# Patient Record
Sex: FEMALE | Race: WHITE
[De-identification: names, ages, dates, MRNs, and addresses within clinical notes are randomized per-mention and may not be internally consistent; named-entity substitution may affect disease eponyms.]

---

## 2017-06-11 ENCOUNTER — HOSPITAL ENCOUNTER (EMERGENCY)
Dept: HOSPITAL 74 - JER | Age: 47
Discharge: HOME | End: 2017-06-11
Payer: COMMERCIAL

## 2017-06-11 VITALS — SYSTOLIC BLOOD PRESSURE: 138 MMHG | DIASTOLIC BLOOD PRESSURE: 88 MMHG | HEART RATE: 88 BPM

## 2017-06-11 VITALS — BODY MASS INDEX: 31.9 KG/M2

## 2017-06-11 VITALS — TEMPERATURE: 98.4 F

## 2017-06-11 DIAGNOSIS — R10.13: Primary | ICD-10-CM

## 2017-06-11 DIAGNOSIS — R19.7: ICD-10-CM

## 2017-06-11 DIAGNOSIS — Z88.8: ICD-10-CM

## 2017-06-11 LAB
ALBUMIN SERPL-MCNC: 3.3 G/DL (ref 3.4–5)
ALP SERPL-CCNC: 64 U/L (ref 45–117)
ALT SERPL-CCNC: 17 U/L (ref 12–78)
ANION GAP SERPL CALC-SCNC: 10 MMOL/L (ref 8–16)
AST SERPL-CCNC: 15 U/L (ref 15–37)
BASOPHILS # BLD: 0.1 % (ref 0–2)
BILIRUB SERPL-MCNC: 0.4 MG/DL (ref 0.2–1)
CALCIUM SERPL-MCNC: 7.9 MG/DL (ref 8.5–10.1)
CO2 SERPL-SCNC: 23 MMOL/L (ref 21–32)
COCKROFT - GAULT: 179.92
CREAT SERPL-MCNC: 0.6 MG/DL (ref 0.55–1.02)
DEPRECATED RDW RBC AUTO: 16.1 % (ref 11.6–15.6)
EOSINOPHIL # BLD: 0.6 % (ref 0–4.5)
GLUCOSE SERPL-MCNC: 84 MG/DL (ref 74–106)
MCH RBC QN AUTO: 26.5 PG (ref 25.7–33.7)
MCHC RBC AUTO-ENTMCNC: 32.5 G/DL (ref 32–36)
MCV RBC: 81.6 FL (ref 80–96)
NEUTROPHILS # BLD: 78.3 % (ref 42.8–82.8)
PLATELET # BLD AUTO: 203 K/MM3 (ref 134–434)
PMV BLD: 7.6 FL (ref 7.5–11.1)
PROT SERPL-MCNC: 6.3 G/DL (ref 6.4–8.2)
WBC # BLD AUTO: 6.6 K/MM3 (ref 4–10)

## 2017-06-11 PROCEDURE — 3E033NZ INTRODUCTION OF ANALGESICS, HYPNOTICS, SEDATIVES INTO PERIPHERAL VEIN, PERCUTANEOUS APPROACH: ICD-10-PCS

## 2017-06-11 PROCEDURE — 3E0337Z INTRODUCTION OF ELECTROLYTIC AND WATER BALANCE SUBSTANCE INTO PERIPHERAL VEIN, PERCUTANEOUS APPROACH: ICD-10-PCS

## 2017-06-11 PROCEDURE — 3E033GC INTRODUCTION OF OTHER THERAPEUTIC SUBSTANCE INTO PERIPHERAL VEIN, PERCUTANEOUS APPROACH: ICD-10-PCS

## 2017-06-11 NOTE — PDOC
History of Present Illness





- General


Chief Complaint: Pain


Stated Complaint: ABDOMINAL PAIN/NAUSEA


Time Seen by Provider: 06/11/17 11:46


History Source: Patient


Exam Limitations: No Limitations





- History of Present Illness


Initial Comments: 


CHIEF COMPLAINT:  45 y/o afebrile female with no significant PMH c/o nausea, 

epigastric pain and diarrhea x 4 days. 





HISTORY OF PRESENT ILLNESS:  The patient states her symptoms started thursday 

and were intermittent.  however, today the abdominal pain became constant and 

she feels more nauseous.  She denies f/c, vomiting, HA, CP, SOB, back pain, 

hematuria, dysuria, bloody diarrhea.  She does not recall eating anything 

strange or new before her symptoms started. 





Vital signs on arrival are within normal limits. 





REVIEW OF SYSTEMS:


GENERAL/CONSTITUTIONAL: No fever/chills. No weakness. No weight change.


HEAD, EYES, EARS, NOSE AND THROAT: No change in vision. No ear pain or 

discharge. No sore throat.


CARDIOVASCULAR: No chest pain or shortness of breath.


RESPIRATORY: No cough, wheezing, or hemoptysis.


GASTROINTESTINAL: +abd pain, nausea, diarrhea.  No vomiting.   No blood in 

stool. 


GENITOURINARY: No dysuria, frequency, or change in urination.


MUSCULOSKELETAL: No joint or muscle swelling or pain. No neck or back pain.


SKIN: No rash or easy bruising.


NEUROLOGIC: No headache, vertigo, loss of consciousness, or loss of sensation.








PHYSICAL EXAM:


GENERAL: The patient is awake, alert, and fully oriented, in no acute distress.

  She is well appearing and ambulatory. 


HEAD: Normal with no signs of trauma.


ENT: Pupils equal, round and reactive to light, extraocular movements intact, 

sclera anicteric, conjunctiva clear. Neck supple.


LUNGS: Clear to auscultation bilaterally. Normal excursion. No respiratory 

distress or use of accessory muscles.


CV: RRR, S1/S2, no MRG. Cap refill < 2 sec.


ABDOMEN: Soft, very minimal TTP of epigastric region.  Negative espitia's sign.  

No mcburney's point TTP.  No rebound, guarding or rigidity. 


EXTREMITIES: Normal range of motion, no edema.


NEUROLOGICAL: Normal speech, normal gait. CN II-XII grossly intact.


PSYCH: Normal mood, normal affect.


SKIN: Warm, dry, normal turgor, no rashes or lesions noted.














Past History





- Past Medical History


Allergies/Adverse Reactions: 


 Allergies











Allergy/AdvReac Type Severity Reaction Status Date / Time


 


meperidine HCl [From Demerol] Allergy Unknown  Verified 06/11/17 11:43


 


promethazine HCl Allergy Unknown  Verified 06/11/17 11:43





[From Phenergan]     











Home Medications: 


Ambulatory Orders





Famotidine [Pepcid -] 20 mg PO BID #14 tablet 06/11/17 


Mag Hydrox/Al Hydrox/Simeth [Maalox Maximum Strength Susp] 355 ml PO QID #1 

bottle 06/11/17 


Metoclopramide HCl [Reglan -] 10 mg PO QID #30 tablet 06/11/17 








Anemia: Yes


Asthma: No


Cancer: No


Cardiac Disorders: No


CVA: No


COPD: No


CHF: No


Dementia: No


Diabetes: No


GI Disorders: No


 Disorders: No


HTN: No


Hypercholesterolemia: No


Liver Disease: No


Seizures: No


Thyroid Disease: No





- Surgical History


Abdominal Surgery: No


Appendectomy: No


Cardiac Surgery: No


Cholecystectomy: No


Lung Surgery: No


Neurologic Surgery: No


Orthopedic Surgery:  ((R) ACL)





- Psycho/Social/Smoking Cessation Hx


Anxiety: No


Suicidal Ideation: No


Smoking History: Never smoked


Have you smoked in the past 12 months: No


Information on smoking cessation initiated: No


Hx Alcohol Use: No


Drug/Substance Use Hx: No


Substance Use Type: None





*Physical Exam





- Vital Signs


 Last Vital Signs











Temp Pulse Resp BP Pulse Ox


 


 98.4 F   89   18   140/88   100 


 


 06/11/17 11:41  06/11/17 11:41  06/11/17 11:41  06/11/17 11:41  06/11/17 11:41














ED Treatment Course





- LABORATORY


CBC & Chemistry Diagram: 


 06/11/17 13:02





 06/11/17 13:02





Medical Decision Making





- Medical Decision Making


A/P:  45 y/o female with nausea, epigastric pain and diarrhea for the past 4 

days.  Plan is as follows:





1. Labs


2. UA


3. IV fluids


4. IV zofran


5. IV pepcid








Labs unremarkable.


UA normal





Pt still with pain and nausea after pepcid and zofran. 


Ordered Protonix and carafate





Pt states she still has pain and nausea.


Will give morphine, reglan and benadryl





The patient states she feels much better now.  Her abdominal pain is relieved 

and she is no longer nauseous.  She wants to go home.  Will discharge to home 

with rx for Pepcid, reglan and maalox.  Suggested she follow GERD diet 

instructions, take only tylenol for pain, follow up with Dr. Leon in 1-2 weeks 

and return to the ER with any worsening or concerning symptoms. 





The patient verbalizes understanding of all instructions, has no further 

questions and is awaiting discharge.











*DC/Admit/Observation/Transfer


Diagnosis at time of Disposition: 


 Epigastric abdominal pain





Diarrhea


Qualifiers:


 Diarrhea type: unspecified type Qualified Code(s): R19.7 - Diarrhea, 

unspecified





- Discharge Dispostion


Disposition: HOME


Condition at time of disposition: Improved





- Prescriptions


Prescriptions: 


Mag Hydrox/Al Hydrox/Simeth [Maalox Maximum Strength Susp] 355 ml PO QID #1 

bottle


Famotidine [Pepcid -] 20 mg PO BID #14 tablet


Metoclopramide HCl [Reglan -] 10 mg PO QID #30 tablet





- Referrals


Referrals: 


Fernando Leon MD [Staff Physician] - 1 week





- Patient Instructions


Printed Discharge Instructions:  DI for Epigastric Pain, GERD Diet, DI for 

Gastroesophageal Reflux Disease (GERD)


Additional Instructions: 


Discharge Instructions:


-3 prescriptions were sent to your pharmacy; please take as prescribed


-Take only Tylenol if needed for pain; do not take Advil/motrin/aleve/ibuprofen


-Follow diet instructions


-Follow up with Dr. Leon within 2 weeks


-Return to the ER immediately with any worsening or concerning symptoms

## 2024-06-24 ENCOUNTER — OFFICE (OUTPATIENT)
Dept: URBAN - METROPOLITAN AREA CLINIC 30 | Facility: CLINIC | Age: 54
Setting detail: OPHTHALMOLOGY
End: 2024-06-24
Payer: COMMERCIAL

## 2024-06-24 DIAGNOSIS — H52.4: ICD-10-CM

## 2024-06-24 DIAGNOSIS — H43.813: ICD-10-CM

## 2024-06-24 PROCEDURE — 92004 COMPRE OPH EXAM NEW PT 1/>: CPT | Performed by: OPHTHALMOLOGY

## 2024-06-24 PROCEDURE — 92015 DETERMINE REFRACTIVE STATE: CPT | Performed by: OPHTHALMOLOGY

## 2024-06-24 ASSESSMENT — CONFRONTATIONAL VISUAL FIELD TEST (CVF)
OD_FINDINGS: FULL
OS_FINDINGS: FULL